# Patient Record
(demographics unavailable — no encounter records)

---

## 2024-10-17 NOTE — HISTORY OF PRESENT ILLNESS
[8] : 8 [Dull/Aching] : dull/aching [Localized] : localized [Constant] : constant [Standing] : standing [Walking] : walking [Stairs] : stairs [] : This patient has had an injection before: yes [Steroid] : Steroid [de-identified] : 10/17/2024: Pt returns today with knee pain since February, is looking for ASP & inj, is being treated with Dr. Sena.  [FreeTextEntry1] : right knee  [de-identified] : Dr. Sena

## 2024-10-17 NOTE — DISCUSSION/SUMMARY
[de-identified] : 50f with atraumatic right knee pain and effusion.  MRI with suspicion for inflammatory or infectious arthropathy and without meniscal or ligament tear Labs with pseudogout and without signs of infection  1) aspiration right knee today - tolerated well  - 25cc 2) c/w with rheumatologist f/up and states she has a new consult scheduled as well.  3) cryotherapy, rest and activity modification    Entered by Shirin Woody acting as scribe. Dr. Sena- The documentation recorded by the scribe accurately reflects the service I personally performed and the decisions made by me.

## 2024-10-17 NOTE — PHYSICAL EXAM
[Right] : right knee [NL (0)] : extension 0 degrees [Negative] : negative Millicent's [] : patient ambulates without assistive device [TWNoteComboBox7] : flexion 120 degrees

## 2024-10-17 NOTE — DATA REVIEWED
[FreeTextEntry1] : 2/27/24:  inflammatory arthropathy with large joint effusion and synovitis. Large popliteal cyst which may have ruptured.

## 2024-10-17 NOTE — DISCUSSION/SUMMARY
[de-identified] : too soon for repeat csi discussed risks involved with repeat asp - pt understood and wished to proceed asp 20 cc R knee - cloudy synovial fluid fu mait / rheum    -----------------------------------------------   Large joint corticosteroid injection given: Right knee   Patient indicated for injection after trial of rest, OTC medications including aspirin, Ibuprofen, Aleve etc or prescription NSAIDS, and/or exercises at home and/ or physical therapy without satisfactory response. Patient has symptoms including pain, swelling, and/or decreased mobility in the joint. The risks, benefits, and alternatives to corticosteroid injection were explained in full to the patient, including but not limited to infection, sepsis, bleeding, scarring, skin discoloration, temporary increase in pain, syncopal episode, failure to resolve symptoms, allergic reaction, symptom recurrence, and elevation of blood sugar in diabetics. Patient understood the risks. All questions were answered. After discussion of options, patient requested an injection.   Oral informed consent was obtained and sterile technique was utilized for the procedure including the preparation of the solutions used for the injection and betadine followed by alcohol prep to the injection site. Anesthesia was given with ethyl chloride sprayed topically. The injection was delivered. Patient tolerated the procedure well.   Post Procedure Instructions: Patient was advised to call if redness, pain, or fever occur and apply ice for 15 min on and 15 min off later today   Medications delivered: Lidocaine: 3 cc per knee

## 2024-10-17 NOTE — PHYSICAL EXAM
[] : no extensor lag [FreeTextEntry8] : calf is soft and compressible, no sign of dvt [TWNoteComboBox7] : flexion 120 degrees

## 2024-10-17 NOTE — HISTORY OF PRESENT ILLNESS
[de-identified] : 9/24/24: Pt here to f/u R knee pain. States twinges of pain and antalgic gait persists. Pain anterior and posterior knee, and feels like cyst may have returned posterior knee after some increased activity.   States saw rheumatologist and cleared for not having rheumatoid arthritis.   9/10/24: Here to f/up right knee. [facility]- CSI/ASP of RT knee performed. Reports good relief since this MDP and CSI/ASP but still experiencing constant dull/aching pain. Currently Taking Advil every day.   8/13/24: Here for f/up right knee. Took MDP with some relief. Feels that she has been seeing a return of swelling and pain over the psoterior aspect of the right knee.  6/20/24: Here for right knee follow up. Injection at last visit provided relief. Pain returned recently without injury. She had an episode of increased pain 2 days ago after standing up while biking. She is taking Meloxicam to treat it.  3/5/24: here to follow up on right knee. Received aspiration last visit. Inquiring about repeat aspiration/CSI. No improvement. Difficulty with walking. Pain to posterior, and will radiate into calf. 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 She went to [facility] urgent care where xrays were done. She was  given a knee brace and toradol injection with some relief.  She has been icing. No nsaids. No fevers/chills.  No multi joint pain.  PMhx: hypothyroid [] : no [FreeTextEntry5] : Went to [facility] on 8/23/24- CSI/ASP of RT knee performed. Reports good relief since this MDP and CSI/ASP but still experiencing constant dull/aching pain. Currently Taking Advil every day.

## 2024-10-28 NOTE — HISTORY OF PRESENT ILLNESS
[2] : 2 [Dull/Aching] : dull/aching [Walking] : walking [de-identified] : 10/28/24: Pt here to f/u R knee pain. Aspiration at last visit with partial relief. Had another aspiration right knee on 10.17.24 at [facility]. States no change in symptoms. Saw her rheumatologist and requested MRI but her insurance co denied it. [facility] rheumatologist dx her with psoriatic arthritis, and would like to put her on meds for it, but she is hesitant. 'hurts all the time.' Pain persists lateral and posterior knee. Worse at night. Constant LBP. n/t lateral thigh and calf.   9/24/24: Pt here to f/u R knee pain. States twinges of pain and antalgic gait persists. Pain anterior and posterior knee, and feels like cyst may have returned posterior knee after some increased activity.   [facility] saw rheumatologist and cleared for not having rheumatoid arthritis.  9/10/24: Here to f/up right knee. Went to [facility]  on 8/[facility]/24- CSI/ASP of RT knee performed. Reports good relief since this MDP and CSI/ASP but still experiencing constant dull/aching pain. Currently Taking Advil every day.   8/13/24: Here for f/up right knee. Took MDP with some relief. Feels that she has been seeing a return of swelling and pain over the psoterior aspect of the right knee.  6/20/24: Here for right knee follow up. Injection at last visit provided relief. Pain returned recently without injury. She had an episode of increased pain 2 days ago after standing up while biking. She is taking Meloxicam to treat it.  3/5/24: here to follow up on right knee. Received aspiration last visit. Inquiring about repeat aspiration/CSI. No improvement. Difficulty with walking. Pain to posterior, and will radiate into calf. 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 She went to [facility] urgent care where xrays were done. She was  given a knee brace and toradol injection with some relief.  She has been icing. No nsaids. No fevers/chills.  No multi joint pain.  PMhx: hypothyroid [] : no [FreeTextEntry5] : Went to [facility] on 8/23/24- CSI/ASP of RT knee performed. Reports good relief since this MDP and CSI/ASP but still experiencing constant dull/aching pain. Currently Taking Advil every day.

## 2024-10-28 NOTE — DISCUSSION/SUMMARY
[de-identified] : 50f with atraumatic right knee pain and effusion.  MRI with suspicion for inflammatory or infectious arthropathy and without meniscal or ligament tear Labs with pseudogout and without signs of infection.  s/p asp 8/13 (w/ csi). 9/24 and 10/17. 1) persistent pain, has failed conservative treatment, updated MRI right knee 2) c/w with rheumatologist f/up  3) cryotherapy, rest and activity modification  4) rtc after MRI   Entered by Shirin Woody acting as scribe. Dr. Sena- The documentation recorded by the scribe accurately reflects the service I personally performed and the decisions made by me.

## 2024-10-28 NOTE — PHYSICAL EXAM
[Right] : right knee [NL (0)] : extension 0 degrees [Negative] : negative Millicent's [] : antalgic [FreeTextEntry8] : calf is soft and compressible, no sign of dvt [de-identified] : hamstring tightness with SLR.  [TWNoteComboBox7] : flexion 120 degrees

## 2024-12-03 NOTE — PROCEDURE
[FreeTextEntry3] : Aspiration  was performed of the right knee. Aspiration was indicated due to effusion. The amount aspirated was 25 cc. The amount aspirated was clear nsf. Patient tolerated procedure well.  Large joint injection was performed of the right knee. An injection of Lidocaine 3cc of 1% , Bupivacaine (Marcaine) 6cc of 0.5% , Triamcinolone (Kenalog) 2cc of 40 mg  was used. Patient was advised to call if redness, pain or fever occur and apply ice for 15 minutes out of every hour for the next 12-24 hours as tolerated.   Patient has tried OTC's including aspirin, Ibuprofen, Aleve, etc or prescription NSAIDS, and/or exercises at home and/or physical therapy without satisfactory response, patient had decreased mobility in the joint and the risks benefits, and alternatives have been discussed, and verbal consent was obtained.  The site was prepped with alcohol, betadine and ethyl chloride sprayed topically  The risks, benefits and contents of the injection have been discussed.  Risks include but are not limited to allergic reaction, flare reaction, permanent white skin discoloration at the injection site and infection.  The patient understands the risks and agrees to having the injection.  All questions have been answered.  Ultrasound guidance was indicated for this patient due to prior failure or difficult injection. All ultrasound images have been permanently captured and stored accordingly in our picture archiving and communication system.

## 2024-12-03 NOTE — DISCUSSION/SUMMARY
[de-identified] : 50f with right knee pain and effusion.  MRI with improving but persistent inflammatory or infectious arthropathy and without meniscal or ligament tear Labs with pseudogout and without signs of infection.  s/p asp 8/13 (w/ csi). 9/24 and 10/17.  1) csi / asp right knee today - tolerated well - 25cc 2) c/w rheumatology f/up  3) cryotherapy, rest and activity modification  4) hep   Entered by Shirin Woody acting as scribe. Dr. Sena- The documentation recorded by the scribe accurately reflects the service I personally performed and the decisions made by me.

## 2024-12-03 NOTE — PHYSICAL EXAM
[Right] : right knee [NL (0)] : extension 0 degrees [Negative] : negative Millicent's [] : no pain with varus stress [FreeTextEntry8] : calf is soft and compressible, no sign of dvt [de-identified] : hamstring tightness with SLR.  [TWNoteComboBox7] : flexion 120 degrees

## 2024-12-03 NOTE — HISTORY OF PRESENT ILLNESS
[de-identified] : 12/3/2024: Patient here for follow up right knee. Present for review of MRI results from [facility] 11/25/2024. She reports that on 11/26/24 she tripped and fell, landing onto both her knees onto a hard surface.   10/28/24: Pt here to f/u R knee pain. Aspiration at last visit with partial relief. Had another aspiration right knee on 10.17.24 at [facility]. States no change in symptoms. Saw her rheumatologist and requested MRI but her insurance co denied it. [facility] rheumatologist dx her with psoriatic arthritis, and would like to put her on meds for it, but she is hesitant. 'hurts all the time.' Pain persists lateral and posterior knee. Worse at night. Constant LBP. n/t lateral thigh and calf.   9/24/24: Pt here to f/u R knee pain. States twinges of pain and antalgic gait persists. Pain anterior and posterior knee, and feels like cyst may have returned posterior knee after some increased activity.   [facility] saw rheumatologist and cleared for not having rheumatoid arthritis.  9/10/24: Here to f/up right knee. Went to [facility]24- CSI/ASP of RT knee performed. Reports good relief since this MDP and CSI/ASP but still experiencing constant dull/aching pain. Currently Taking Advil every day.   8/13/24: Here for f/up right knee. Took MDP with some relief. Feels that she has been seeing a return of swelling and pain over the psoterior aspect of the right knee.  6/20/24: Here for right knee follow up. Injection at last visit provided relief. Pain returned recently without injury. She had an episode of increased pain 2 days ago after standing up while biking. She is taking Meloxicam to treat it.  3/5/24: here to follow up on right knee. Received aspiration last visit. Inquiring about repeat aspiration/CSI. No improvement. Difficulty with walking. Pain to posterior, and will radiate into calf. 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 She went to [facility] urgent care where xrays were done. She was  given a knee brace and toradol injection with some relief.  She has been icing. No nsaids. No fevers/chills.  No multi joint pain.  PMhx: hypothyroid

## 2025-03-06 NOTE — PROCEDURE
[FreeTextEntry3] : Aspiration  was performed of the left knee. Aspiration was indicated due to effusion. The amount aspirated was 10 cc. The amount aspirated was clear nsf. Patient tolerated procedure well.  Large joint injection was performed of the left knee. An injection of Lidocaine 3cc of 1% , Bupivacaine (Marcaine) 6cc of 0.5% , Triamcinolone (Kenalog) 2cc of 40 mg  was used.  Patient was advised to call if redness, pain or fever occur and apply ice for 15 minutes out of every hour for the next 12-24 hours as tolerated.   Patient has tried OTC's including aspirin, Ibuprofen, Aleve, etc or prescription NSAIDS, and/or exercises at home and/or physical therapy without satisfactory response, patient had decreased mobility in the joint and the risks benefits, and alternatives have been discussed, and verbal consent was obtained.  The site was prepped with alcohol, betadine and ethyl chloride sprayed topically  The risks, benefits and contents of the injection have been discussed.  Risks include but are not limited to allergic reaction, flare reaction, permanent white skin discoloration at the injection site and infection.  The patient understands the risks and agrees to having the injection.  All questions have been answered.  Ultrasound guidance was indicated for this patient due to prior failure or difficult injection. All ultrasound images have been permanently captured and stored accordingly in our picture archiving and communication system.

## 2025-03-06 NOTE — PHYSICAL EXAM
[Left] : left knee [NL (0)] : extension 0 degrees [4___] : quadriceps 4[unfilled]/5 [] : ligamentously not stable [TWNoteComboBox7] : flexion 130 degrees

## 2025-03-06 NOTE — DISCUSSION/SUMMARY
[de-identified] : 51f with left knee effusion. pmhx psoriatic arthritis.  The patient was advised of the diagnosis. The natural history of the pathology was explained in full to the patient in layman's terms. All questions were answered.  1) csi / asp left knee today - tolerated well - 10cc 2) cryotherapy, rest and activity modification  3) hep  4) rtc prn   Entered by Shirin Woody acting as scribe. Dr. Sena- The documentation recorded by the scribe accurately reflects the service I personally performed and the decisions made by me.

## 2025-03-06 NOTE — IMAGING
[Left] : left knee [All Views] : anteroposterior, lateral, skyline, and anteroposterior standing [There are no fractures, subluxations or dislocations. No significant abnormalities are seen] : There are no fractures, subluxations or dislocations. No significant abnormalities are seen [Degenerative change] : Degenerative change normal...

## 2025-03-06 NOTE — HISTORY OF PRESENT ILLNESS
[de-identified] : 03/06/2025 Ms. KIN EPPERSON, a 51-year-old female, presents today for L knee pain. No known injury/fall. Experiencing swelling and sore/tight pain since 1/2025. Has applied heat and did some PT with some relief.  Has psoriatic arthritis and is on methotrexate which is causing eyelid swelling. She will be taking a steroid for this.    [FreeTextEntry1] : L knee

## 2025-05-02 NOTE — IMAGING
[Disc space narrowing] : Disc space narrowing [All Views] : anteroposterior, lateral [There are no fractures, subluxations or dislocations. No significant abnormalities are seen] : There are no fractures, subluxations or dislocations. No significant abnormalities are seen

## 2025-05-03 NOTE — DISCUSSION/SUMMARY
[Medication Risks Reviewed] : Medication risks reviewed [de-identified] : 52yo female presents with lumbar radiculopathy and lumbar spasm  History of psoriatic arthritis that is currently trying to be controlled Takes prednisone 10mg as needed as per rheumatologist Will get MRI Lumbar to eval for herniated disc Use of heat discussed Instructed to attend PT  RTC after MRI to review results

## 2025-05-03 NOTE — DISCUSSION/SUMMARY
[Medication Risks Reviewed] : Medication risks reviewed [de-identified] : 52yo female presents with lumbar radiculopathy and lumbar spasm  History of psoriatic arthritis that is currently trying to be controlled Takes prednisone 10mg as needed as per rheumatologist Will get MRI Lumbar to eval for herniated disc Use of heat discussed Instructed to attend PT  RTC after MRI to review results

## 2025-05-03 NOTE — HISTORY OF PRESENT ILLNESS
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 Has been seeing Dr. Sena for B/L knee pain. Pt describes back pain associated with limited ROM and shooting pain located posteriorly.  Hx: Psoriatic Arthritis

## 2025-05-03 NOTE — REASON FOR VISIT
"I have called and discussed with mother. Seems that when the patient goes to [facility] house it is very stressful, the patient and sibling are having more and more urinary and stool accidents, they seem withdrawn, and are not \"happy\", etc. Denies any known neglect/ abuse but is suspicious of him screaming at them and not helping them bath/ toilet etc. I have discussed that if she has any hint of child neglect, maltreatment, and or abuse she needs to call CPS right away. Mother and Father are going through a custody ibarra as well at this time.   Dicussed I am more than happy to place a referral for psychology and or see the patient in office.   Mother will monitor the next few days and call / make an apt.   "
1. Caller Name: mom called                         Call Back Number: [phone] (home)         How would the patient prefer to be contacted with a response: Phone call OK to leave a detailed message    mom called is concerned for pt has noticed when they come back from dads house pt seems to be having accidents hell pee or poop himself some behavior concerns as well they seems different, mom wanted to know if she should be concerned, can you please advise    
[FreeTextEntry2] : Low Back Pain
[FreeTextEntry2] : Low Back Pain

## 2025-05-09 NOTE — PROCEDURE
[FreeTextEntry3] :    Large joint injection was performed of the right knee. The indication for this procedure was pain and inflammation. The site was prepped with alcohol, betadine, ethyl chloride sprayed topically and sterile technique used. An injection of Lidocaine 3cc of 1%  was used.   Aspiration was indicated due to popliteal cyst. The amount aspirated was 5cc. The amount aspirated was clear nsf. Patient tolerated procedure well.    Patient has tried OTC's including aspirin, Ibuprofen, Aleve, etc or prescription NSAIDS, and/or exercises at home and/or physical therapy without satisfactory response, patient had decreased mobility in the joint and the risks benefits, and alternatives have been discussed, and verbal consent was obtained.   Ultrasound guidance was indicated for this patient due to aspiration of Baker's cyst. All ultrasound images have been permanently captured and stored accordingly in our picture archiving and communication system. Visualization of the needle and placement of injection was performed without complication.

## 2025-05-09 NOTE — IMAGING
[Disc space narrowing] : Disc space narrowing [Left] : left knee [All Views] : anteroposterior, lateral, skyline, and anteroposterior standing [There are no fractures, subluxations or dislocations. No significant abnormalities are seen] : There are no fractures, subluxations or dislocations. No significant abnormalities are seen [Degenerative change] : Degenerative change

## 2025-05-09 NOTE — DISCUSSION/SUMMARY
[Medication Risks Reviewed] : Medication risks reviewed [de-identified] : 50f with right knee pain and effusion.  MRI with improving but persistent inflammatory or infectious arthropathy and without meniscal or ligament tear Labs with pseudogout and without signs of infection.    1) asp popliteal cyst right knee today - tolerated well - 5cc  2) cryotherapy, rest and activity modification  3) hep  4) rtc prn    Entered by Shirin Woody acting as scribe. Dr. Sena- The documentation recorded by the scribe accurately reflects the service I personally performed and the decisions made by me.

## 2025-05-09 NOTE — HISTORY OF PRESENT ILLNESS
[de-identified] : 5/9/25: Here to f/up right knee. Reports that recently she has been experiencing increased right knee pain, stiffness and swelling.  12/3/2024: Patient here for follow up right knee. Present for review of MRI results from [facility] 11/25/2024. She reports that on 11/26/24 she tripped and fell, landing onto both her knees onto a hard surface.  10/28/24: Pt here to f/u R knee pain. Aspiration at last visit with partial relief. Had another aspiration right knee on 10.17.24 at [facility]. States no change in symptoms. Saw her rheumatologist and requested MRI but her insurance co denied it. [facility] rheumatologist dx her with psoriatic arthritis, and would like to put her on meds for it, but she is hesitant. 'hurts all the time.' Pain persists lateral and posterior knee. Worse at night. Constant LBP. n/t lateral thigh and calf.  9/24/24: Pt here to f/u R knee pain. States twinges of pain and antalgic gait persists. Pain anterior and posterior knee, and feels like cyst may have returned posterior knee after some increased activity. States saw rheumatologist and cleared for not having rheumatoid arthritis. 9/10/24: Here to f/up right knee. Went to [facility]- CSI/ASP of RT knee performed. Reports good relief since this MDP and CSI/ASP but still experiencing constant dull/aching pain. Currently Taking Advil every day.  8/13/24: Here for f/up right knee. Took MDP with some relief. Feels that she has been seeing a return of swelling and pain over the psoterior aspect of the right knee. 6/20/24: Here for right knee follow up. Injection at last visit provided relief. Pain returned recently without injury. She had an episode of increased pain 2 days ago after standing up while biking. She is taking Meloxicam to treat it. 3/5/24: here to follow up on right knee. Received aspiration last visit. Inquiring about repeat aspiration/CSI. No improvement. Difficulty with walking. Pain to posterior, and will radiate into calf. 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 She went to [facility] urgent care where xrays were done. She was given a knee brace and toradol injection with some relief. She has been icing. No nsaids. No fevers/chills. No multi joint pain. PMhx: hypothyroid  [FreeTextEntry1] : L knee

## 2025-05-09 NOTE — PHYSICAL EXAM
[NL (0)] : extension 0 degrees [Right] : right knee [Negative] : negative Millicent's [] : patient ambulates without assistive device [FreeTextEntry8] : calf is soft and compressible, no sign of dvt [de-identified] : hamstring tightness with SLR.  [TWNoteComboBox7] : flexion 120 degrees

## 2025-05-16 NOTE — HISTORY OF PRESENT ILLNESS
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 Has been seeing Dr. Sena for B/L knee pain. Pt describes back pain associated with limited ROM and shooting pain located posteriorly.  Hx: Psoriatic Arthritis   ----------------------------------  5/16/25: hERE FOR FU - PLAN AT LAST WAS "50yo female presents with lumbar radiculopathy and lumbar spasm  History of psoriatic arthritis that is currently trying to be controlled Takes prednisone 10mg as needed as per rheumatologist Will get MRI Lumbar to eval for herniated disc Use of heat discussed Instructed to attend PT  RTC after MRI to review results"  on steroids from rheum considering biologic agent - hasnt started it yet  mrI l SPINE - SEE REPORT - [place]  BY MY READ- mild degernerative changes

## 2025-05-16 NOTE — DISCUSSION/SUMMARY
[Medication Risks Reviewed] : Medication risks reviewed [de-identified] : reviewed the case and the imaging with the patient  lower back pain and down the legs  discussion of the condition and treatment options cautions discussed questions answered discussion of natural history of the condition and what the next step would be PT/pain management  the mri findings mild - hard to say if the pain from the back or the psoraitic arthritis   the ovarian cyst she is aware of